# Patient Record
Sex: FEMALE | Race: WHITE
[De-identification: names, ages, dates, MRNs, and addresses within clinical notes are randomized per-mention and may not be internally consistent; named-entity substitution may affect disease eponyms.]

---

## 2019-01-18 ENCOUNTER — HOSPITAL ENCOUNTER (OUTPATIENT)
Dept: HOSPITAL 92 - L&D/OP | Age: 24
Discharge: HOME HEALTH | End: 2019-01-18
Attending: OBSTETRICS & GYNECOLOGY
Payer: COMMERCIAL

## 2019-01-18 VITALS — DIASTOLIC BLOOD PRESSURE: 77 MMHG | SYSTOLIC BLOOD PRESSURE: 125 MMHG | TEMPERATURE: 98.3 F

## 2019-01-18 VITALS — BODY MASS INDEX: 37.8 KG/M2

## 2019-01-18 DIAGNOSIS — O16.3: Primary | ICD-10-CM

## 2019-01-18 DIAGNOSIS — E03.9: ICD-10-CM

## 2019-01-18 DIAGNOSIS — Z91.038: ICD-10-CM

## 2019-01-18 DIAGNOSIS — J30.81: ICD-10-CM

## 2019-01-18 DIAGNOSIS — O99.283: ICD-10-CM

## 2019-01-18 DIAGNOSIS — Z91.018: ICD-10-CM

## 2019-01-18 DIAGNOSIS — Z3A.37: ICD-10-CM

## 2019-01-18 DIAGNOSIS — Z79.899: ICD-10-CM

## 2019-01-18 DIAGNOSIS — Z88.2: ICD-10-CM

## 2019-01-18 LAB
ALBUMIN SERPL BCG-MCNC: 3.1 G/DL (ref 3.5–5)
ALP SERPL-CCNC: 128 U/L (ref 40–150)
ALT SERPL W P-5'-P-CCNC: 14 U/L (ref 8–55)
ANION GAP SERPL CALC-SCNC: 11 MMOL/L (ref 10–20)
AST SERPL-CCNC: 14 U/L (ref 5–34)
BASOPHILS # BLD AUTO: 0 THOU/UL (ref 0–0.2)
BASOPHILS NFR BLD AUTO: 0.5 % (ref 0–1)
BILIRUB SERPL-MCNC: 0.6 MG/DL (ref 0.2–1.2)
BUN SERPL-MCNC: 8 MG/DL (ref 7–18.7)
CALCIUM SERPL-MCNC: 8.8 MG/DL (ref 7.8–10.44)
CHLORIDE SERPL-SCNC: 108 MMOL/L (ref 98–107)
CO2 SERPL-SCNC: 22 MMOL/L (ref 22–29)
CREAT CL PREDICTED SERPL C-G-VRATE: 197 ML/MIN (ref 70–130)
EOSINOPHIL # BLD AUTO: 0.2 THOU/UL (ref 0–0.7)
EOSINOPHIL NFR BLD AUTO: 1.8 % (ref 0–10)
GLOBULIN SER CALC-MCNC: 3.1 G/DL (ref 2.4–3.5)
GLUCOSE SERPL-MCNC: 105 MG/DL (ref 70–105)
HGB BLD-MCNC: 12.7 G/DL (ref 12–16)
LYMPHOCYTES # BLD: 1.8 THOU/UL (ref 1.2–3.4)
LYMPHOCYTES NFR BLD AUTO: 21.5 % (ref 21–51)
MCH RBC QN AUTO: 29.4 PG (ref 27–31)
MCV RBC AUTO: 88.6 FL (ref 78–98)
MONOCYTES # BLD AUTO: 0.5 THOU/UL (ref 0.11–0.59)
MONOCYTES NFR BLD AUTO: 6 % (ref 0–10)
NEUTROPHILS # BLD AUTO: 5.9 THOU/UL (ref 1.4–6.5)
NEUTROPHILS NFR BLD AUTO: 70.1 % (ref 42–75)
PLATELET # BLD AUTO: 239 THOU/UL (ref 130–400)
POTASSIUM SERPL-SCNC: 3.8 MMOL/L (ref 3.5–5.1)
PROT UR-MCNC: 18 MG/DL (ref 1–14)
RBC # BLD AUTO: 4.32 MILL/UL (ref 4.2–5.4)
SODIUM SERPL-SCNC: 137 MMOL/L (ref 136–145)
WBC # BLD AUTO: 8.4 THOU/UL (ref 4.8–10.8)

## 2019-01-18 PROCEDURE — 82570 ASSAY OF URINE CREATININE: CPT

## 2019-01-18 PROCEDURE — 87081 CULTURE SCREEN ONLY: CPT

## 2019-01-18 PROCEDURE — 84156 ASSAY OF PROTEIN URINE: CPT

## 2019-01-18 PROCEDURE — 85025 COMPLETE CBC W/AUTO DIFF WBC: CPT

## 2019-01-18 PROCEDURE — 80053 COMPREHEN METABOLIC PANEL: CPT

## 2019-01-18 PROCEDURE — 99283 EMERGENCY DEPT VISIT LOW MDM: CPT

## 2019-01-18 PROCEDURE — 36415 COLL VENOUS BLD VENIPUNCTURE: CPT

## 2019-01-18 NOTE — PDOC.LDHP
Labor and Delivery H&P


Chief complaint: other (elevated BPs)


HPI: 





22 y/o G1 at 37w5d, patient of Dr. Bowden, sent from clinic for elevated BPs.  

Denies VB, LOF, ctx, HA, vision changes, or decreased FM.  Initial BP in clinic 

was elevated yesterday.  Patient returned today and was elevated again, then 

was sent here for evaluation.





ROS neg for HEENT, CV, pulm, GI, , neuro, psych, skin, musculoskeletal, or 

constitutional symptoms other than mentioned above.


OB History Details: 





First pregnancy


Current pregnancy complications: none


Past Medical History: 





Hypothyroid


Allergies


Current medications: pre-rani vitamins, other ([place] Thyroid 90mcg)


Previous surgical history: none


Allergies/Adverse Reactions: 


 Allergies











Allergy/AdvReac Type Severity Reaction Status Date / Time


 


banana Allergy   Verified 19 13:26


 


horse dander Allergy   Verified 19 13:25


 


insect venom Allergy   Verified 19 13:25


 


Sulfa (Sulfonamide Allergy   Verified 19 13:24





Antibiotics)     











Social history: none





- Physical Exam


Vital signs reviewed and normal: yes


General: NAD, resting


Lungs: nonlabored breathing


Abdomen: NTTP


Extremeties: no edema


FHT: category 1 (130s, mod variability, + accels, no decels)


[place] contractions every: none





- Assessment





22 y/o G1 at 37w5d with no e/o preeclampsia.  BPs wnl, labs wnl.  Fetal status 

reassuring with reactive NST.





- Plan


-: 





D/c home with precautions.  Advised to keep prenatal appointments.

## 2019-01-21 ENCOUNTER — HOSPITAL ENCOUNTER (OUTPATIENT)
Dept: HOSPITAL 92 - L&D/OP | Age: 24
Discharge: HOME | End: 2019-01-21
Attending: OBSTETRICS & GYNECOLOGY
Payer: COMMERCIAL

## 2019-01-21 VITALS — TEMPERATURE: 97.6 F | DIASTOLIC BLOOD PRESSURE: 91 MMHG | SYSTOLIC BLOOD PRESSURE: 138 MMHG

## 2019-01-21 VITALS — BODY MASS INDEX: 37.5 KG/M2

## 2019-01-21 DIAGNOSIS — Z91.038: ICD-10-CM

## 2019-01-21 DIAGNOSIS — O99.283: ICD-10-CM

## 2019-01-21 DIAGNOSIS — O16.3: Primary | ICD-10-CM

## 2019-01-21 DIAGNOSIS — Z91.048: ICD-10-CM

## 2019-01-21 DIAGNOSIS — Z79.899: ICD-10-CM

## 2019-01-21 DIAGNOSIS — Z88.2: ICD-10-CM

## 2019-01-21 DIAGNOSIS — Z3A.36: ICD-10-CM

## 2019-01-21 DIAGNOSIS — Z91.018: ICD-10-CM

## 2019-01-21 PROCEDURE — 99282 EMERGENCY DEPT VISIT SF MDM: CPT

## 2019-01-21 NOTE — PRG
DATE OF SERVICE:  01/21/2019



HISTORY OF PRESENT ILLNESS:  The patient is a 23-year-old G1, P0 female with an

intrauterine pregnancy at 36 weeks and 5 days, who presented to Labor and Delivery

with worries about elevated blood pressures at home.  The patient reports that she

has had some higher than normal pressures in the office and chose to get a blood

pressure cuff.  She has been checking them at home when they have been in the 130s

to 150s systolic and came in for evaluation.  The patient denies headache, chest

pain, shortness of breath, nausea, vomiting, diarrhea, or constipation.  The patient

does report new rash that she has been diagnosed with PUPPPs and uses

over-the-counter hydrocortisone to help with itching.  She denies any palmar or

plantar itching.  The patient denies any vaginal bleeding, leakage of fluid, or

urinary urgency.  The patient did have lab work last Friday just three days prior to

this visit for PIH workup, which came back all negative. 



PAST MEDICAL HISTORY:  Thyroid disorder.



PAST SURGICAL HISTORY:  Colonoscopy.



SOCIAL HISTORY:  Denies drug, alcohol, or tobacco use.



ALLERGIES:  SULFA DRUGS.



MEDICATIONS:  

1. [place] Thyroid 90 mg tablet daily.

2. T3 25 mcg capsule daily.



OB LABORATORY DATA:  Blood type is A positive.  Antibody screen is negative.  VDRL

in the first trimester is negative.  Chlamydia is negative.  She is rubella immune.

Diabetic screen, her Glucola is 78.  HIV in the third trimester is negative. 



REVIEW OF SYSTEMS:  Per HPI.



PHYSICAL EXAMINATION:

VITAL SIGNS:  The patient was here for 2 hours for blood pressure evaluation.  She

was noted to have pressures ranging from 124/76, most recent pressure upon discharge

and 138/91 on her initial pressure.  T-max is 141/81, heart rate low 100s,

saturating 98% on room air, temperature 97.8. 

GENERAL:  She appears to be in no acute distress.  She is alert, oriented,

cooperative, and pleasant to interact with. 

HEAD:  Normocephalic and atraumatic. 

LUNGS:  Clear to auscultation bilaterally. 

HEART:  Regular rate and rhythm. 

ABDOMEN:  Soft and gravid. 

EXTREMITIES:  Nontender and nonedematous.  The patient has 2+ DTRs, but is noted to

have some beat of clonus. 

:  Exam has been deferred. 



Fetal heart tracing performed.  Fetus noted to have baseline in the 130s with

moderate long-term variability, positive 15 x 15 accelerations, no decelerations.

Tocometer does not show any regular contraction pattern. 



LABORATORY DATA:  Lab work reviewed from last Friday shows white count of 8.4,

hemoglobin 12.7, hematocrit is 38.3, platelets of 239,000.  Creatinine of 0.63, BUN

of 8, AST of 14, ALT of 14.  Urine protein to creatinine ratio less than 0.1. 



ASSESSMENT AND PLAN:  The patient is a 23-year-old G1, P0 female with an

intrauterine pregnancy at 36 weeks and 5 days, who came for blood pressure

evaluation.  Blood pressures here have been primarily normal with couple borderline

pressures with 141 systolic and 93 diastolic.  The patient is having no symptoms.

The patient has an appointment in two days with Dr. Bowden, which we have encouraged

that she continue to keep.  Fetus has reactive NST and category 1 tracing. 







Job ID:  [id number]

## 2019-01-27 ENCOUNTER — HOSPITAL ENCOUNTER (INPATIENT)
Dept: HOSPITAL 92 - L&D | Age: 24
LOS: 4 days | Discharge: HOME | End: 2019-01-31
Attending: OBSTETRICS & GYNECOLOGY | Admitting: OBSTETRICS & GYNECOLOGY
Payer: COMMERCIAL

## 2019-01-27 VITALS — BODY MASS INDEX: 38.3 KG/M2

## 2019-01-27 DIAGNOSIS — Z3A.37: ICD-10-CM

## 2019-01-27 LAB
HBSAG INDEX: 0.21 S/CO (ref 0–0.99)
HGB BLD-MCNC: 13 G/DL (ref 12–16)
MCH RBC QN AUTO: 31.1 PG (ref 27–31)
MCV RBC AUTO: 89.7 FL (ref 78–98)
PLATELET # BLD AUTO: 222 THOU/UL (ref 130–400)
RBC # BLD AUTO: 4.18 MILL/UL (ref 4.2–5.4)
SYPHILIS ANTIBODY INDEX: 0.04 S/CO
WBC # BLD AUTO: 9 THOU/UL (ref 4.8–10.8)

## 2019-01-27 PROCEDURE — 87340 HEPATITIS B SURFACE AG IA: CPT

## 2019-01-27 PROCEDURE — 86901 BLOOD TYPING SEROLOGIC RH(D): CPT

## 2019-01-27 PROCEDURE — 76815 OB US LIMITED FETUS(S): CPT

## 2019-01-27 PROCEDURE — 85027 COMPLETE CBC AUTOMATED: CPT

## 2019-01-27 PROCEDURE — S0020 INJECTION, BUPIVICAINE HYDRO: HCPCS

## 2019-01-27 PROCEDURE — 86900 BLOOD TYPING SEROLOGIC ABO: CPT

## 2019-01-27 PROCEDURE — 86780 TREPONEMA PALLIDUM: CPT

## 2019-01-27 PROCEDURE — 51702 INSERT TEMP BLADDER CATH: CPT

## 2019-01-27 PROCEDURE — 86850 RBC ANTIBODY SCREEN: CPT

## 2019-01-27 NOTE — PDOC.LDHP
Labor and Delivery H&P


Chief complaint: scheduled induction


HPI: 





gest htn to severe range with multiple observations in office and L&D.  


Current gestational age (weeks): 37


Due date: 19


Dating criteria: last menstrual period, first trimester ultrasound


Grav: 1


Para: 0


Current pregnancy complications: gestational hypertension


Abnormal US findings: No


Past Medical History: 





Hypothyroid tx by татьяна until his sudden halfway.  i have refilled her 

meds as rx by him during the pregnancy 


Current medications: pre-rani vitamins, other


Previous surgical history: none


Allergies/Adverse Reactions: 


 Allergies











Allergy/AdvReac Type Severity Reaction Status Date / Time


 


banana Allergy   Verified 19 16:30


 


horse dander Allergy   Verified 19 16:30


 


insect venom Allergy   Verified 19 16:30


 


Sulfa (Sulfonamide Allergy   Verified 19 16:30





Antibiotics)     











Social history: none





- Physical Exam


Vital signs reviewed and normal: yes


General: NAD, resting


Heart: RRR


Lungs: CTAB


Abdomen: NTTP


Extremeties: trace edema


FHT: category 1





- Vaginal Exam


cm dilated: 1


Effacement: 25%


Station: -2





- OB Labs


Blood type: A


RH: positive


Antibody Screen: negative


HIV: negative


RPR: negative


HEPSAg: negative


1 hour GCT: negative


GBS: negative


Urine drug screen: not done


Rubella: immune





- Assessment


L&D Assessment: medically indicated induction





- Plan


Plan: admit to L&D, cervical ripening, labor augmentation if indicated, 

informed consent obtained, anesthesia consult for pain management

## 2019-01-28 PROCEDURE — 10907ZC DRAINAGE OF AMNIOTIC FLUID, THERAPEUTIC FROM PRODUCTS OF CONCEPTION, VIA NATURAL OR ARTIFICIAL OPENING: ICD-10-PCS | Performed by: OBSTETRICS & GYNECOLOGY

## 2019-01-28 PROCEDURE — 3E033VJ INTRODUCTION OF OTHER HORMONE INTO PERIPHERAL VEIN, PERCUTANEOUS APPROACH: ICD-10-PCS | Performed by: OBSTETRICS & GYNECOLOGY

## 2019-01-28 RX ADMIN — SODIUM CHLORIDE SCH: 0.9 INJECTION, SOLUTION INTRAVENOUS at 19:22

## 2019-01-29 PROCEDURE — 0KQM0ZZ REPAIR PERINEUM MUSCLE, OPEN APPROACH: ICD-10-PCS | Performed by: OBSTETRICS & GYNECOLOGY

## 2019-01-29 RX ADMIN — SODIUM CHLORIDE SCH MLS: 0.9 INJECTION, SOLUTION INTRAVENOUS at 02:51

## 2019-01-29 RX ADMIN — DOCUSATE CALCIUM SCH MG: 240 CAPSULE, LIQUID FILLED ORAL at 21:29

## 2019-01-29 NOTE — PDOC.LDPN
Labor & Delivery Progress Note





- Subjective


Subjective: comfortable, no concerns





- Objective


Vital signs reviewed and normal: yes


General: NAD


Uterine fundus: non tender


Dilation: 9


Effacement: 100%


Station: 1+


FHT: category 1


Other exam findings: pit at 8





- Assessment


(1) Gestational [pregnancy-induced] hypertension without significant proteinuria

, unspecified trimester


Code(s): O13.9 - GESTATIONAL HTN W/O SIGNIFICANT PROTEINURIA, UNSP TRIMESTER   

Current Visit: Yes   Status: Acute   


Plan: continue plan of care (off shift at 0800.  dr garcia will get checkout.  

will return to unit for delivery.  )

## 2019-01-29 NOTE — PDOC.LDPN
Labor & Delivery Progress Note





- Subjective


Subjective: comfortable





- Objective


Vital signs reviewed and normal: yes


General: NAD, resting


Uterine fundus: non tender


Dilation: 8


Effacement: 100%


Station: 1+


FHT: category 2, variable decelerations (strip reviewed since 2200, episodes of 

cat 2 and 1 noted.  varible w fse placement, ow recent strip is cat 1 )


[place] contractions every: 5.5


FSE placed: yes


Resuscitative measures: maternal position change





- Assessment


(1) Gestational [pregnancy-induced] hypertension without significant proteinuria

, unspecified trimester


Code(s): O13.9 - GESTATIONAL HTN W/O SIGNIFICANT PROTEINURIA, UNSP TRIMESTER   

Current Visit: Yes   Status: Acute   


Plan: continue plan of care, resuscitative measures, other (slow but steady 

progress.  pit now at 2.  continue iol.  )

## 2019-01-30 RX ADMIN — DOCUSATE CALCIUM SCH MG: 240 CAPSULE, LIQUID FILLED ORAL at 09:38

## 2019-01-30 RX ADMIN — DOCUSATE CALCIUM SCH MG: 240 CAPSULE, LIQUID FILLED ORAL at 21:30

## 2019-01-31 VITALS — SYSTOLIC BLOOD PRESSURE: 131 MMHG | TEMPERATURE: 98.2 F | DIASTOLIC BLOOD PRESSURE: 72 MMHG

## 2019-01-31 RX ADMIN — DOCUSATE CALCIUM SCH MG: 240 CAPSULE, LIQUID FILLED ORAL at 08:39
